# Patient Record
(demographics unavailable — no encounter records)

---

## 2025-03-12 NOTE — PHYSICAL EXAM
[de-identified] : RAMILA IMPACTED CERUMEN REMOVED [Normal] : mucosa is normal [Midline] : trachea located in midline position